# Patient Record
(demographics unavailable — no encounter records)

---

## 2019-01-31 NOTE — RAD
RIGHT ANKLE THREE VIEWS: 

1/31/19

 

No fracture, dislocation, or acute bony change was seen. The articular surfaces of the ankle appear n
ormal. Calcaneal spur was seen. The posterior process of the talus is ununited. 

 

IMPRESSION:  

No acute bony finding. 

 

POS: HOME